# Patient Record
Sex: MALE | Race: WHITE
[De-identification: names, ages, dates, MRNs, and addresses within clinical notes are randomized per-mention and may not be internally consistent; named-entity substitution may affect disease eponyms.]

---

## 2019-07-28 ENCOUNTER — HOSPITAL ENCOUNTER (EMERGENCY)
Dept: HOSPITAL 5 - ED | Age: 44
Discharge: HOME | End: 2019-07-28
Payer: SELF-PAY

## 2019-07-28 VITALS — SYSTOLIC BLOOD PRESSURE: 110 MMHG | DIASTOLIC BLOOD PRESSURE: 74 MMHG

## 2019-07-28 DIAGNOSIS — Z79.899: ICD-10-CM

## 2019-07-28 DIAGNOSIS — S91.011A: Primary | ICD-10-CM
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
## 2019-07-28 PROCEDURE — 90471 IMMUNIZATION ADMIN: CPT

## 2019-07-28 PROCEDURE — 90715 TDAP VACCINE 7 YRS/> IM: CPT

## 2019-07-28 PROCEDURE — 99282 EMERGENCY DEPT VISIT SF MDM: CPT

## 2019-07-28 NOTE — EMERGENCY DEPARTMENT REPORT
ED Laceration HPI





- HPI


Chief Complaint: Wound/Laceration


Stated Complaint: LACERATION ON (R) LEG


Time Seen by Provider: 07/28/19 17:26


Occurred When: Today


Location: Lower Extremity


Tetanus Status: Not up to Date


Laceration Symptoms: Yes Pain, No Foreign Body Sensation, No Numbness, No 

Weakness


Other History: This is a 44-year-old male who presents to ED complaining of 

laceration to right ankle that happened earlier today.  Patient states he was 

working with an electric saw when he accidentally cut him on his right ankle.  

Patient states he went home after the incident.  Patient states bleeding was 

controlled after he put some black coffee on the laceration.  Patient states his

tetanus is not up to date.





ED Review of Systems


ROS: 


Stated complaint: LACERATION ON (R) LEG


Other details as noted in HPI





Comment: All other systems reviewed and negative





ED Past Medical Hx





- Past Medical History


Previous Medical History?: No





- Surgical History


Past Surgical History?: No





- Social History


Smoking Status: Never Smoker


Substance Use Type: None





- Medications


Home Medications: 


                                Home Medications











 Medication  Instructions  Recorded  Confirmed  Last Taken  Type


 


Cyclobenzaprine [Flexeril] 10 mg PO TID PRN #20 tablet 07/26/16  Unknown Rx


 


Acetaminophen/Codeine [Tylenol 1 tab PO Q6H #8 tab 07/28/19  Unknown Rx





/Codeine # 3 tab]     


 


Ibuprofen [Motrin 800 MG tab] 800 mg PO Q8HR PRN #30 tablet 07/28/19  Unknown Rx


 


cephALEXin [Keflex] 500 mg PO Q12HR #14 cap 07/28/19  Unknown Rx














Laceration Physical Exam





- Exam


General: 


Vital signs noted. No distress. Alert and acting appropriately.





Wound Length (cm): 3


Laceration Location: Lower Extremity (the right medial ankle)


Laceration Exam: Yes Normal Distal CMS, No Foreign Body, No Exposed Tendon, 

Vessel, or Nerve, No Tendon Injury





ED Course


                                   Vital Signs











  07/28/19 07/28/19





  17:01 17:11


 


Temperature 97.8 F 


 


Pulse Rate 69 


 


Respiratory 18 17





Rate  


 


Blood Pressure 90/67 


 


O2 Sat by Pulse 98 





Oximetry  














- Laceration /Wound Repair


  ** Right Lower Medial Ankle


Wound Location: lower extremity


Wound Length (cm): 4


Wound's Depth, Shape: superficial, into muscle, linear


Irrigated w/ Saline (ccs): 300


Betadine Prep?: Yes


Anesthesia: 1% Lidocaine


Volume Anesthetic (ccs): 6


Wound Debrided: minimal


Wound Repaired With: sutures


Suture Size/Type: 4:0, proline


Number of Sutures: 8


Layer Closure?: Yes


Deep Layer Suture Size/Type: 5:0, chromic


Sterile Dressing Applied?: Yes





ED Medical Decision Making





- Medical Decision Making





44-year-old male presents with right ankle laceration.


She received pain medication in the ED, tetanus booster.


The  3-4 cm laceration  wound was prepped and draped in sterile fashion. 

Anesthesia was achieved with 6mL of 1% lidocaine. 


 The wound was irrigated with 300cc NS and explored. There were no foreign 

bodies 


The wound was reapproximated in 2 layer 


 There was excellent reapproximation of the wound edges.


The patient tolerated the procedure without complication


Discussed patient to return in 10-14 days for suture removal.


Discussed follow-up with primary care physician


Vital signs are normal patient is in no acute distress.





Critical care attestation.: 


If time is entered above; I have spent that time in minutes in the direct care 

of this critically ill patient, excluding procedure time.








ED Disposition


Clinical Impression: 


 Laceration of ankle





Disposition: DC-01 TO HOME OR SELFCARE


Is pt being admited?: No


Does the pt Need Aspirin: No


Condition: Stable


Instructions:  Suture Care (ED), Laceration (ED), Absorbable Suture Care (ED)


Additional Instructions: 


Make sure to follow up with the primary care physician as discussed.


Take all your medications as you've been prescribed.


If you have any worsening symptoms or develop new symptoms please return to ED 

immediately.


Prescriptions: 


cephALEXin [Keflex] 500 mg PO Q12HR #14 cap


Ibuprofen [Motrin 800 MG tab] 800 mg PO Q8HR PRN #30 tablet


 PRN Reason: Pain


Acetaminophen/Codeine [Tylenol /Codeine # 3 tab] 1 tab PO Q6H #8 tab


Referrals: 


[facility] [place] [Primary Care Provider] - 3-5 Days


[facility] [Outside] - 3-5 Days


[facility] [Outside] - 3-5 Days


Forms:  Accompanied Note, Work/School Release Form(ED)


Time of Disposition: 20:28


Print Language: Indonesian